# Patient Record
Sex: FEMALE
[De-identification: names, ages, dates, MRNs, and addresses within clinical notes are randomized per-mention and may not be internally consistent; named-entity substitution may affect disease eponyms.]

---

## 2017-01-24 ENCOUNTER — CHARTING TRANS (OUTPATIENT)
Dept: OTHER | Age: 42
End: 2017-01-24

## 2017-07-18 ENCOUNTER — APPOINTMENT (OUTPATIENT)
Dept: GENERAL RADIOLOGY | Age: 42
End: 2017-07-18
Attending: PHYSICIAN ASSISTANT
Payer: COMMERCIAL

## 2017-07-18 ENCOUNTER — HOSPITAL ENCOUNTER (OUTPATIENT)
Age: 42
Discharge: HOME | End: 2017-07-18
Payer: COMMERCIAL

## 2017-07-18 VITALS
DIASTOLIC BLOOD PRESSURE: 87 MMHG | SYSTOLIC BLOOD PRESSURE: 119 MMHG | HEART RATE: 82 BPM | BODY MASS INDEX: 27.49 KG/M2 | HEIGHT: 65 IN | WEIGHT: 165 LBS | OXYGEN SATURATION: 99 % | TEMPERATURE: 98 F | RESPIRATION RATE: 18 BRPM

## 2017-07-18 DIAGNOSIS — S90.31XA CONTUSION OF RIGHT HEEL, INITIAL ENCOUNTER: Primary | ICD-10-CM

## 2017-07-18 PROCEDURE — 99213 OFFICE O/P EST LOW 20 MIN: CPT

## 2017-07-18 PROCEDURE — 99203 OFFICE O/P NEW LOW 30 MIN: CPT

## 2017-07-18 PROCEDURE — 73650 X-RAY EXAM OF HEEL: CPT | Performed by: PHYSICIAN ASSISTANT

## 2017-07-19 NOTE — ED INITIAL ASSESSMENT (HPI)
Patient presents to [facility]. Care with cc of right foot/heel injurywhen she came off a slide into the water/pool floor. Now painful to walk and flex toes -pain radiates to up to ankle.

## 2017-07-19 NOTE — ED PROVIDER NOTES
Patient Seen in: [facility]    History   Patient presents with:  Lower Extremity Injury (musculoskeletal)    Stated Complaint: FOOT INJURY     HPI    Patient is a pleasant 30-year-old female that arrives for evaluation of a righ to palpation of the plantar aspect of the right heel. No obvious deformity. No significant ecchymosis. No pain to palpation along the metatarsals. No Achilles insertion tenderness.   Negative Gloria sign  Back: Full range of motion  Skin: No sign of t

## 2017-11-16 ENCOUNTER — OFFICE VISIT (OUTPATIENT)
Dept: FAMILY MEDICINE CLINIC | Facility: CLINIC | Age: 42
End: 2017-11-16

## 2017-11-16 VITALS
HEIGHT: 65 IN | TEMPERATURE: 98 F | DIASTOLIC BLOOD PRESSURE: 84 MMHG | HEART RATE: 94 BPM | RESPIRATION RATE: 20 BRPM | BODY MASS INDEX: 28.82 KG/M2 | OXYGEN SATURATION: 99 % | WEIGHT: 173 LBS | SYSTOLIC BLOOD PRESSURE: 122 MMHG

## 2017-11-16 DIAGNOSIS — L73.9 FOLLICULITIS: ICD-10-CM

## 2017-11-16 DIAGNOSIS — B00.89 HERPETIC DERMATITIS: Primary | ICD-10-CM

## 2017-11-16 PROCEDURE — 99203 OFFICE O/P NEW LOW 30 MIN: CPT | Performed by: PHYSICIAN ASSISTANT

## 2017-11-16 RX ORDER — ACYCLOVIR 50 MG/G
1 CREAM TOPICAL
Qty: 5 G | Refills: 0 | Status: SHIPPED | OUTPATIENT
Start: 2017-11-16 | End: 2018-01-26 | Stop reason: ALTCHOICE

## 2017-11-16 RX ORDER — MUPIROCIN CALCIUM 20 MG/G
1 CREAM TOPICAL DAILY
Qty: 30 G | Refills: 0 | Status: SHIPPED | OUTPATIENT
Start: 2017-11-16 | End: 2017-11-30

## 2017-11-16 NOTE — PROGRESS NOTES
HPI:    Patient ID: Mickey Anderson is a 38year old female. She is a new patient to the practice. HPI  Pt presents to clinic with painful bumps on the bottom of her right foot for the past 2 weeks.  Started on the outside of her right foot and now has mo normal. She has no wheezes. Lymphadenopathy:     She has no cervical adenopathy. Skin:   Right groin region with erythematous papules, some with scabbing. Right ball of foot with cluster of vesicles, with central darkening. Mild tenderness.  No active d

## 2018-01-14 ENCOUNTER — OFFICE VISIT (OUTPATIENT)
Dept: FAMILY MEDICINE CLINIC | Facility: CLINIC | Age: 43
End: 2018-01-14

## 2018-01-14 VITALS
DIASTOLIC BLOOD PRESSURE: 70 MMHG | TEMPERATURE: 98 F | HEART RATE: 89 BPM | BODY MASS INDEX: 28.82 KG/M2 | OXYGEN SATURATION: 98 % | WEIGHT: 173 LBS | SYSTOLIC BLOOD PRESSURE: 106 MMHG | RESPIRATION RATE: 18 BRPM | HEIGHT: 65 IN

## 2018-01-14 DIAGNOSIS — J06.9 UPPER RESPIRATORY TRACT INFECTION, UNSPECIFIED TYPE: Primary | ICD-10-CM

## 2018-01-14 PROCEDURE — 99213 OFFICE O/P EST LOW 20 MIN: CPT | Performed by: NURSE PRACTITIONER

## 2018-01-14 NOTE — PROGRESS NOTES
CHIEF COMPLAINT:   Patient presents with:  Cough/URI: x 6 days      HPI:   Dejuan Pedersen is a 38year old female who presents for upper respiratory symptoms for  6 days.  Patient reports sore throat only at the beginning of sx's, congestion, dry cough, ear NOSE: Nostrils patent, clear nasal discharge, nasal mucosa red and inflammed   THROAT: Oral mucosa pink, moist. Posterior pharynx is not erythematous. no exudates. Tonsils 2/4.     NECK: Supple, non-tender  LUNGS: clear to auscultation bilaterally, no wheez Almost all colds involve a stuffy nose. Other common symptoms include:  · Runny nose  · Sneezing  · Sore throat  · Headache  · Cough  How is a cold treated? Colds usually last 5 to 10 days. Treatment focuses on relieving symptoms.  Treatments may include: Colds usually go away by themselves. But it’s not unusual to get another type of infection while you have a cold.  These can include:  · Sinus infection  · Lung infection, such as bronchitis or pneumonia  · Ear infection  If you have asthma or chronic bronc

## 2018-01-26 ENCOUNTER — OFFICE VISIT (OUTPATIENT)
Dept: FAMILY MEDICINE CLINIC | Facility: CLINIC | Age: 43
End: 2018-01-26

## 2018-01-26 VITALS
HEIGHT: 65 IN | SYSTOLIC BLOOD PRESSURE: 110 MMHG | HEART RATE: 110 BPM | WEIGHT: 174 LBS | BODY MASS INDEX: 28.99 KG/M2 | RESPIRATION RATE: 18 BRPM | DIASTOLIC BLOOD PRESSURE: 72 MMHG | TEMPERATURE: 99 F | OXYGEN SATURATION: 98 %

## 2018-01-26 DIAGNOSIS — J01.00 ACUTE NON-RECURRENT MAXILLARY SINUSITIS: Primary | ICD-10-CM

## 2018-01-26 PROCEDURE — 99213 OFFICE O/P EST LOW 20 MIN: CPT | Performed by: FAMILY MEDICINE

## 2018-01-26 RX ORDER — AMOXICILLIN AND CLAVULANATE POTASSIUM 875; 125 MG/1; MG/1
1 TABLET, FILM COATED ORAL 2 TIMES DAILY
Qty: 28 TABLET | Refills: 0 | Status: SHIPPED | OUTPATIENT
Start: 2018-01-26 | End: 2018-02-09

## 2018-01-26 NOTE — PATIENT INSTRUCTIONS
States she got up from kneeling position yesterday and felt a pop in in her. C/o pain to left 4th toe. Extra Tylenol 2 ours ago with some relief.has taken too much.   Take a probiotic while on the antibiotic and for 7 days after you are done.     flonase twice a day    Hold sudafed    No Mucinex

## 2018-11-05 VITALS
DIASTOLIC BLOOD PRESSURE: 60 MMHG | HEIGHT: 65 IN | BODY MASS INDEX: 26.66 KG/M2 | TEMPERATURE: 99 F | RESPIRATION RATE: 18 BRPM | WEIGHT: 159.99 LBS | OXYGEN SATURATION: 98 % | SYSTOLIC BLOOD PRESSURE: 102 MMHG | HEART RATE: 85 BPM

## 2019-01-08 PROBLEM — Z98.891 HISTORY OF C-SECTION: Status: ACTIVE | Noted: 2019-01-08

## 2019-01-08 PROBLEM — Z98.890 H/O KNEE SURGERY: Status: ACTIVE | Noted: 2019-01-08

## 2019-01-08 PROBLEM — Z98.890 H/O RIGHT KNEE SURGERY: Status: ACTIVE | Noted: 2019-01-08

## 2019-01-08 PROBLEM — Z98.890 H/O LEFT KNEE SURGERY: Status: ACTIVE | Noted: 2019-01-08

## 2019-01-08 PROBLEM — Z98.890 HISTORY OF D&C: Status: ACTIVE | Noted: 2019-01-08

## 2019-01-08 PROBLEM — Z97.5 IUD (INTRAUTERINE DEVICE) IN PLACE: Status: ACTIVE | Noted: 2019-01-08

## 2019-01-08 PROCEDURE — 88175 CYTOPATH C/V AUTO FLUID REDO: CPT | Performed by: NURSE PRACTITIONER

## 2019-01-08 PROCEDURE — 87624 HPV HI-RISK TYP POOLED RSLT: CPT | Performed by: NURSE PRACTITIONER

## 2019-03-04 ENCOUNTER — HOSPITAL ENCOUNTER (OUTPATIENT)
Dept: MAMMOGRAPHY | Age: 44
Discharge: HOME | End: 2019-03-04
Attending: NURSE PRACTITIONER
Payer: COMMERCIAL

## 2019-03-04 DIAGNOSIS — Z12.39 SCREENING FOR MALIGNANT NEOPLASM OF BREAST: ICD-10-CM

## 2019-03-04 PROCEDURE — 77067 SCR MAMMO BI INCL CAD: CPT | Performed by: NURSE PRACTITIONER

## 2019-03-04 PROCEDURE — 77063 BREAST TOMOSYNTHESIS BI: CPT | Performed by: NURSE PRACTITIONER

## 2020-01-03 ENCOUNTER — OFFICE VISIT (OUTPATIENT)
Dept: FAMILY MEDICINE CLINIC | Facility: CLINIC | Age: 45
End: 2020-01-03
Payer: COMMERCIAL

## 2020-01-03 VITALS
DIASTOLIC BLOOD PRESSURE: 66 MMHG | WEIGHT: 174.81 LBS | TEMPERATURE: 99 F | HEIGHT: 65 IN | SYSTOLIC BLOOD PRESSURE: 102 MMHG | HEART RATE: 93 BPM | OXYGEN SATURATION: 99 % | BODY MASS INDEX: 29.12 KG/M2 | RESPIRATION RATE: 20 BRPM

## 2020-01-03 DIAGNOSIS — J22 LOWER RESPIRATORY INFECTION (E.G., BRONCHITIS, PNEUMONIA, PNEUMONITIS, PULMONITIS): Primary | ICD-10-CM

## 2020-01-03 DIAGNOSIS — J98.01 BRONCHOSPASM: ICD-10-CM

## 2020-01-03 DIAGNOSIS — R05.9 COUGH: ICD-10-CM

## 2020-01-03 PROCEDURE — 99213 OFFICE O/P EST LOW 20 MIN: CPT | Performed by: NURSE PRACTITIONER

## 2020-01-03 RX ORDER — BENZONATATE 200 MG/1
200 CAPSULE ORAL 3 TIMES DAILY PRN
Qty: 20 CAPSULE | Refills: 0 | Status: SHIPPED | OUTPATIENT
Start: 2020-01-03 | End: 2020-01-10

## 2020-01-03 RX ORDER — PREDNISONE 20 MG/1
20 TABLET ORAL 2 TIMES DAILY
Qty: 10 TABLET | Refills: 0 | Status: SHIPPED | OUTPATIENT
Start: 2020-01-03 | End: 2020-01-08

## 2020-01-03 RX ORDER — AZITHROMYCIN 250 MG/1
TABLET, FILM COATED ORAL
Qty: 6 TABLET | Refills: 0 | Status: SHIPPED | OUTPATIENT
Start: 2020-01-03 | End: 2020-01-03

## 2020-01-03 RX ORDER — PREDNISONE 20 MG/1
20 TABLET ORAL 2 TIMES DAILY
Qty: 10 TABLET | Refills: 0 | Status: SHIPPED | OUTPATIENT
Start: 2020-01-03 | End: 2020-01-03

## 2020-01-03 RX ORDER — AZITHROMYCIN 250 MG/1
TABLET, FILM COATED ORAL
Qty: 6 TABLET | Refills: 0 | Status: SHIPPED | OUTPATIENT
Start: 2020-01-03

## 2020-01-03 RX ORDER — BENZONATATE 200 MG/1
200 CAPSULE ORAL 3 TIMES DAILY PRN
Qty: 20 CAPSULE | Refills: 0 | Status: SHIPPED | OUTPATIENT
Start: 2020-01-03 | End: 2020-01-03

## 2020-01-03 NOTE — PATIENT INSTRUCTIONS
Bronchospasm (Adult)    Bronchospasm occurs when the airways (bronchial tubes) go into spasm and contract. This makes it hard to breathe and causes wheezing (a high-pitched whistling sound). Bronchospasm can also cause frequent coughing without wheezing. If you are age 72 or older, have a chronic lung disease or condition that affects your immune system, or you smoke, ask your healthcare provider about getting a pneumococcal vaccine, as well as a yearly flu shot (influenza vaccine).   When to seek medical a · You may use over-the-counter medicines to control fever or pain, unless another medicine was prescribed.  If you have chronic liver or kidney disease or have ever had a stomach ulcer or gastrointestinal bleeding, talk with your healthcare provider before · Trouble breathing, wheezing, or pain with breathing  Date Last Reviewed: 6/1/2018  © 6392-3795 [employer]. [address] [facility] [place] [address] [place] [address]. All rights reserved.  This information is not intended as a substitute for professional me

## 2020-01-03 NOTE — PROGRESS NOTES
CHIEF COMPLAINT:   Patient presents with:  Cough: m1butds, tried, sore throat x1wk    mucinex  Delsym  dayquil  Deep cough, hurts    HPI:   Daniela Espana is a 45year old female who presents for cough for  2  months.   Cough started gradually and is describ HENT: Atraumatic, normocephalic. TM's clear bilaterally. Nostrils patent, nasal mucosa pink and non-inflamed. no erythema of the throat. NECK: supple, non-tender. LUNGS: Normal respiratory rate. Normal effort. Dry cough. no wheezing.  No rales, crackl - benzonatate 200 MG Oral Cap; Take 1 capsule (200 mg total) by mouth 3 (three) times daily as needed for cough. Dispense: 20 capsule; Refill: 0    Treat for lower respiratory in lieu of having a negative chest xray.    Diminished right mid base  PLAN:   I · Drink lots of water or other fluids (at least 10 glasses a day) during an attack. This will loosen lung secretions and make it easier to breathe. If you have heart or kidney disease, check with your doctor before you drink extra fluids.   · Take prescribe Bronchitis is an infection of the air passages (bronchial tubes) in your lungs. It often occurs when you have a cold.  This illness is contagious during the first few days and is spread through the air by coughing and sneezing, or by direct contact (touchin Follow up with your healthcare provider, or as advised. If you had an X-ray or ECG (electrocardiogram), a specialist will review it. You will be told of any new test results that may affect your care.   If you are age 72 or older, if you smoke, or if you ha

## 2020-01-24 ENCOUNTER — APPOINTMENT (OUTPATIENT)
Dept: GENERAL RADIOLOGY | Age: 45
End: 2020-01-24
Attending: FAMILY MEDICINE
Payer: COMMERCIAL

## 2020-01-24 ENCOUNTER — HOSPITAL ENCOUNTER (OUTPATIENT)
Age: 45
Discharge: HOME | End: 2020-01-24
Attending: FAMILY MEDICINE
Payer: COMMERCIAL

## 2020-01-24 VITALS
TEMPERATURE: 98 F | HEART RATE: 85 BPM | SYSTOLIC BLOOD PRESSURE: 118 MMHG | RESPIRATION RATE: 18 BRPM | DIASTOLIC BLOOD PRESSURE: 77 MMHG | OXYGEN SATURATION: 97 %

## 2020-01-24 DIAGNOSIS — J98.01 ACUTE BRONCHOSPASM: Primary | ICD-10-CM

## 2020-01-24 DIAGNOSIS — R05.3 PERSISTENT COUGH FOR 3 WEEKS OR LONGER: ICD-10-CM

## 2020-01-24 PROCEDURE — 99214 OFFICE O/P EST MOD 30 MIN: CPT

## 2020-01-24 PROCEDURE — 71046 X-RAY EXAM CHEST 2 VIEWS: CPT | Performed by: FAMILY MEDICINE

## 2020-01-24 PROCEDURE — 94640 AIRWAY INHALATION TREATMENT: CPT

## 2020-01-24 RX ORDER — ALBUTEROL SULFATE 90 UG/1
2 AEROSOL, METERED RESPIRATORY (INHALATION) EVERY 4 HOURS PRN
Qty: 1 INHALER | Refills: 0 | Status: SHIPPED | OUTPATIENT
Start: 2020-01-24

## 2020-01-24 RX ORDER — DEXAMETHASONE 4 MG/1
12 TABLET ORAL ONCE
Status: COMPLETED | OUTPATIENT
Start: 2020-01-24 | End: 2020-01-24

## 2020-01-24 RX ORDER — IPRATROPIUM BROMIDE AND ALBUTEROL SULFATE 2.5; .5 MG/3ML; MG/3ML
3 SOLUTION RESPIRATORY (INHALATION) ONCE
Status: COMPLETED | OUTPATIENT
Start: 2020-01-24 | End: 2020-01-24

## 2020-01-24 RX ORDER — PREDNISONE 20 MG/1
TABLET ORAL
Qty: 6 TABLET | Refills: 0 | Status: SHIPPED | OUTPATIENT
Start: 2020-01-24

## 2020-01-24 RX ORDER — BUDESONIDE AND FORMOTEROL FUMARATE DIHYDRATE 80; 4.5 UG/1; UG/1
2 AEROSOL RESPIRATORY (INHALATION) 2 TIMES DAILY
Qty: 1 INHALER | Refills: 0 | Status: SHIPPED | OUTPATIENT
Start: 2020-01-24

## 2020-01-24 RX ORDER — IPRATROPIUM BROMIDE 21 UG/1
1 SPRAY, METERED NASAL EVERY 12 HOURS
Qty: 1 BOTTLE | Refills: 0 | Status: SHIPPED | OUTPATIENT
Start: 2020-01-24

## 2020-01-24 NOTE — ED PROVIDER NOTES
Patient Seen in: [facility]      History   Patient presents with:  Cough/URI    Stated Complaint: cough, congestion and diffiulty breathing 1 wk    HPI    This 53-year-old female presents to the office with complaint of persiste air)       Current:/77   Pulse 85   Temp 97.7 °F (36.5 °C) (Temporal)   Resp 18   LMP 01/18/2020 (Exact Date)   SpO2 97%         Physical Exam    General: WH/WN/WD, in no respiratory distress, A and O times 3  HEAD: Normocephalic, atraumatic  EYES: S twice daily, albuterol MDI 2 puffs every 4 hours as needed for any chest tightness, shortness of breath or wheezing, prednisone 20 mg tablets 2 tablets once daily with breakfast for 3 days to start on 1/27/2020 are given.   Usage and side effects are review your tongue after use so you do not get thrush which is yeast of the tongue. Carry your albuterol inhaler as this is your rescue medicine. Use your albuterol 2 puffs every 4 hours as needed for any chest tightness, shortness of breath or wheezing.   Start

## 2020-01-24 NOTE — ED INITIAL ASSESSMENT (HPI)
C/o cough on and off since Nov. 2019, with chest congestion and difficulty breathing for a week . Was diagnosed 1/3/2020 lower respiratory infection and completed Zpack. No fever. Taking OTC Dayquil and Delsym with no resolution.

## 2020-07-11 ENCOUNTER — HOSPITAL ENCOUNTER (OUTPATIENT)
Dept: MAMMOGRAPHY | Age: 45
Discharge: HOME | End: 2020-07-11
Attending: OBSTETRICS & GYNECOLOGY
Payer: COMMERCIAL

## 2020-07-11 ENCOUNTER — LAB ENCOUNTER (OUTPATIENT)
Dept: LAB | Age: 45
End: 2020-07-11
Attending: OBSTETRICS & GYNECOLOGY
Payer: COMMERCIAL

## 2020-07-11 DIAGNOSIS — Z13.220 SCREENING FOR HYPERLIPIDEMIA: Primary | ICD-10-CM

## 2020-07-11 DIAGNOSIS — R53.83 FATIGUE, UNSPECIFIED TYPE: ICD-10-CM

## 2020-07-11 DIAGNOSIS — Z12.31 ENCOUNTER FOR SCREENING MAMMOGRAM FOR MALIGNANT NEOPLASM OF BREAST: ICD-10-CM

## 2020-07-11 LAB
BASOPHILS # BLD AUTO: 0.04 X10(3) UL (ref 0–0.2)
BASOPHILS NFR BLD AUTO: 0.5 %
CHOLEST SMN-MCNC: 213 MG/DL (ref ?–200)
DEPRECATED RDW RBC AUTO: 43.1 FL (ref 35.1–46.3)
EOSINOPHIL # BLD AUTO: 0.12 X10(3) UL (ref 0–0.7)
EOSINOPHIL NFR BLD AUTO: 1.6 %
ERYTHROCYTE [DISTWIDTH] IN BLOOD BY AUTOMATED COUNT: 12.7 % (ref 11–15)
GLUCOSE BLD-MCNC: 87 MG/DL (ref 70–99)
HCT VFR BLD AUTO: 44.1 % (ref 35–48)
HDLC SERPL-MCNC: 42 MG/DL (ref 40–59)
HGB BLD-MCNC: 13.3 G/DL (ref 12–16)
IMM GRANULOCYTES # BLD AUTO: 0.01 X10(3) UL (ref 0–1)
IMM GRANULOCYTES NFR BLD: 0.1 %
LDLC SERPL CALC-MCNC: 151 MG/DL (ref ?–100)
LYMPHOCYTES # BLD AUTO: 2.14 X10(3) UL (ref 1–4)
LYMPHOCYTES NFR BLD AUTO: 29 %
MCH RBC QN AUTO: 28.1 PG (ref 26–34)
MCHC RBC AUTO-ENTMCNC: 30.2 G/DL (ref 31–37)
MCV RBC AUTO: 93 FL (ref 80–100)
MONOCYTES # BLD AUTO: 0.45 X10(3) UL (ref 0.1–1)
MONOCYTES NFR BLD AUTO: 6.1 %
NEUTROPHILS # BLD AUTO: 4.62 X10 (3) UL (ref 1.5–7.7)
NEUTROPHILS # BLD AUTO: 4.62 X10(3) UL (ref 1.5–7.7)
NEUTROPHILS NFR BLD AUTO: 62.7 %
NONHDLC SERPL-MCNC: 171 MG/DL (ref ?–130)
PATIENT FASTING Y/N/NP: YES
PATIENT FASTING Y/N/NP: YES
PLATELET # BLD AUTO: 307 10(3)UL (ref 150–450)
RBC # BLD AUTO: 4.74 X10(6)UL (ref 3.8–5.3)
TRIGL SERPL-MCNC: 101 MG/DL (ref 30–149)
TSI SER-ACNC: 2.49 MIU/ML (ref 0.36–3.74)
VLDLC SERPL CALC-MCNC: 20 MG/DL (ref 0–30)
WBC # BLD AUTO: 7.4 X10(3) UL (ref 4–11)

## 2020-07-11 PROCEDURE — 36415 COLL VENOUS BLD VENIPUNCTURE: CPT

## 2020-07-11 PROCEDURE — 77067 SCR MAMMO BI INCL CAD: CPT | Performed by: OBSTETRICS & GYNECOLOGY

## 2020-07-11 PROCEDURE — 85025 COMPLETE CBC W/AUTO DIFF WBC: CPT

## 2020-07-11 PROCEDURE — 80061 LIPID PANEL: CPT

## 2020-07-11 PROCEDURE — 82947 ASSAY GLUCOSE BLOOD QUANT: CPT

## 2020-07-11 PROCEDURE — 77063 BREAST TOMOSYNTHESIS BI: CPT | Performed by: OBSTETRICS & GYNECOLOGY

## 2020-07-11 PROCEDURE — 84443 ASSAY THYROID STIM HORMONE: CPT

## 2020-07-20 ENCOUNTER — HOSPITAL ENCOUNTER (OUTPATIENT)
Dept: MAMMOGRAPHY | Age: 45
Discharge: HOME | End: 2020-07-20
Attending: OBSTETRICS & GYNECOLOGY
Payer: COMMERCIAL

## 2020-07-20 ENCOUNTER — HOSPITAL ENCOUNTER (OUTPATIENT)
Dept: ULTRASOUND IMAGING | Age: 45
Discharge: HOME | End: 2020-07-20
Attending: OBSTETRICS & GYNECOLOGY
Payer: COMMERCIAL

## 2020-07-20 DIAGNOSIS — R92.2 INCONCLUSIVE MAMMOGRAM: ICD-10-CM

## 2020-07-20 PROCEDURE — 76642 ULTRASOUND BREAST LIMITED: CPT | Performed by: OBSTETRICS & GYNECOLOGY

## 2020-07-20 PROCEDURE — 77065 DX MAMMO INCL CAD UNI: CPT | Performed by: OBSTETRICS & GYNECOLOGY

## 2020-07-20 PROCEDURE — 77061 BREAST TOMOSYNTHESIS UNI: CPT | Performed by: OBSTETRICS & GYNECOLOGY

## 2020-07-29 ENCOUNTER — VIRTUAL PHONE E/M (OUTPATIENT)
Dept: FAMILY MEDICINE CLINIC | Facility: CLINIC | Age: 45
End: 2020-07-29
Payer: COMMERCIAL

## 2020-07-29 DIAGNOSIS — E78.00 ELEVATED CHOLESTEROL: Primary | ICD-10-CM

## 2020-07-29 PROCEDURE — 99213 OFFICE O/P EST LOW 20 MIN: CPT | Performed by: FAMILY MEDICINE

## 2020-09-08 ENCOUNTER — VIRTUAL PHONE E/M (OUTPATIENT)
Dept: FAMILY MEDICINE CLINIC | Facility: CLINIC | Age: 45
End: 2020-09-08
Payer: COMMERCIAL

## 2020-09-08 DIAGNOSIS — R05.9 COUGH: Primary | ICD-10-CM

## 2020-09-08 PROCEDURE — 99213 OFFICE O/P EST LOW 20 MIN: CPT | Performed by: FAMILY MEDICINE

## 2020-09-08 NOTE — PROGRESS NOTES
Virtual Telephone Check-In    Nandocecilio Moreno verbally consents to a Virtual/Telephone Check-In visit on 09/08/20. Patient has been referred to the [facility] website at [email or web address] to review the yearly Consent to Treat document.     Patient oDrita Nix recommendations below. 10 Ways to Manage Your Health at Home      1. Stay home from work, school, and away from other public places. If you must go out, avoid using any kind of public transportation, ridesharing, or taxis.    2. Monitor your symptoms ca facemask before you enter the facility. Additional Information      You can also get more information at the following websites:   Centers for Disease Control & Prevention (CDC)  What to do if you are sick with coronavirus disease 2019, https://www. c

## 2020-09-09 ENCOUNTER — APPOINTMENT (OUTPATIENT)
Dept: LAB | Age: 45
End: 2020-09-09
Attending: FAMILY MEDICINE
Payer: COMMERCIAL

## 2020-09-09 DIAGNOSIS — R05.9 COUGH: ICD-10-CM

## 2020-09-11 LAB — SARS-COV-2 RNA RESP QL NAA+PROBE: NOT DETECTED

## 2022-01-17 PROCEDURE — 87624 HPV HI-RISK TYP POOLED RSLT: CPT | Performed by: NURSE PRACTITIONER

## 2022-01-17 PROCEDURE — 87591 N.GONORRHOEAE DNA AMP PROB: CPT | Performed by: NURSE PRACTITIONER

## 2022-01-17 PROCEDURE — 87491 CHLMYD TRACH DNA AMP PROBE: CPT | Performed by: NURSE PRACTITIONER

## 2022-01-17 PROCEDURE — 88175 CYTOPATH C/V AUTO FLUID REDO: CPT | Performed by: NURSE PRACTITIONER

## 2022-03-16 ENCOUNTER — HOSPITAL ENCOUNTER (OUTPATIENT)
Dept: MAMMOGRAPHY | Age: 47
Discharge: HOME | End: 2022-03-16
Attending: NURSE PRACTITIONER
Payer: COMMERCIAL

## 2022-03-16 DIAGNOSIS — Z12.31 ENCOUNTER FOR SCREENING MAMMOGRAM FOR MALIGNANT NEOPLASM OF BREAST: ICD-10-CM

## 2022-03-16 PROCEDURE — 77063 BREAST TOMOSYNTHESIS BI: CPT | Performed by: NURSE PRACTITIONER

## 2022-03-16 PROCEDURE — 77067 SCR MAMMO BI INCL CAD: CPT | Performed by: NURSE PRACTITIONER

## 2022-08-28 ENCOUNTER — HOSPITAL ENCOUNTER (EMERGENCY)
Age: 47
Discharge: HOME | End: 2022-08-28
Payer: COMMERCIAL

## 2022-08-28 ENCOUNTER — APPOINTMENT (OUTPATIENT)
Dept: GENERAL RADIOLOGY | Age: 47
End: 2022-08-28
Payer: COMMERCIAL

## 2022-08-28 VITALS
WEIGHT: 175 LBS | BODY MASS INDEX: 29.16 KG/M2 | RESPIRATION RATE: 16 BRPM | OXYGEN SATURATION: 98 % | DIASTOLIC BLOOD PRESSURE: 93 MMHG | HEART RATE: 88 BPM | SYSTOLIC BLOOD PRESSURE: 131 MMHG | HEIGHT: 65 IN | TEMPERATURE: 98 F

## 2022-08-28 DIAGNOSIS — M75.42 IMPINGEMENT SYNDROME OF LEFT SHOULDER: Primary | ICD-10-CM

## 2022-08-28 PROCEDURE — 99283 EMERGENCY DEPT VISIT LOW MDM: CPT

## 2022-08-28 PROCEDURE — 73030 X-RAY EXAM OF SHOULDER: CPT

## 2022-08-28 PROCEDURE — 99284 EMERGENCY DEPT VISIT MOD MDM: CPT

## 2022-08-28 RX ORDER — PREDNISONE 20 MG/1
40 TABLET ORAL DAILY
Qty: 10 TABLET | Refills: 0 | Status: SHIPPED | OUTPATIENT
Start: 2022-08-28 | End: 2022-09-02

## 2022-08-28 RX ORDER — NAPROXEN 500 MG/1
500 TABLET ORAL 2 TIMES DAILY PRN
Qty: 20 TABLET | Refills: 0 | Status: SHIPPED | OUTPATIENT
Start: 2022-08-28 | End: 2022-09-04

## 2023-06-27 ENCOUNTER — HOSPITAL ENCOUNTER (OUTPATIENT)
Dept: MAMMOGRAPHY | Age: 48
Discharge: HOME | End: 2023-06-27
Payer: COMMERCIAL

## 2023-06-27 DIAGNOSIS — Z12.31 ENCOUNTER FOR SCREENING MAMMOGRAM FOR MALIGNANT NEOPLASM OF BREAST: ICD-10-CM

## 2023-06-27 PROCEDURE — 77063 BREAST TOMOSYNTHESIS BI: CPT

## 2023-06-27 PROCEDURE — 77067 SCR MAMMO BI INCL CAD: CPT

## 2023-07-07 ENCOUNTER — HOSPITAL ENCOUNTER (OUTPATIENT)
Dept: MAMMOGRAPHY | Facility: HOSPITAL | Age: 48
Discharge: HOME | End: 2023-07-07
Payer: COMMERCIAL

## 2023-07-07 DIAGNOSIS — R92.2 INCONCLUSIVE MAMMOGRAM: ICD-10-CM

## 2023-07-07 PROCEDURE — 77066 DX MAMMO INCL CAD BI: CPT

## 2023-07-07 PROCEDURE — 77062 BREAST TOMOSYNTHESIS BI: CPT

## 2023-10-18 ENCOUNTER — OFFICE VISIT (OUTPATIENT)
Dept: FAMILY MEDICINE CLINIC | Facility: CLINIC | Age: 48
End: 2023-10-18
Payer: COMMERCIAL

## 2023-10-18 VITALS
WEIGHT: 175 LBS | TEMPERATURE: 98 F | OXYGEN SATURATION: 96 % | HEART RATE: 83 BPM | SYSTOLIC BLOOD PRESSURE: 129 MMHG | RESPIRATION RATE: 16 BRPM | DIASTOLIC BLOOD PRESSURE: 81 MMHG | HEIGHT: 65 IN | BODY MASS INDEX: 29.16 KG/M2

## 2023-10-18 DIAGNOSIS — R05.8 COUGH PRESENT FOR GREATER THAN 3 WEEKS: Primary | ICD-10-CM

## 2023-10-18 DIAGNOSIS — J01.00 ACUTE MAXILLARY SINUSITIS, RECURRENCE NOT SPECIFIED: ICD-10-CM

## 2023-10-18 PROCEDURE — 3074F SYST BP LT 130 MM HG: CPT | Performed by: NURSE PRACTITIONER

## 2023-10-18 PROCEDURE — 3008F BODY MASS INDEX DOCD: CPT | Performed by: NURSE PRACTITIONER

## 2023-10-18 PROCEDURE — 3079F DIAST BP 80-89 MM HG: CPT | Performed by: NURSE PRACTITIONER

## 2023-10-18 PROCEDURE — 99213 OFFICE O/P EST LOW 20 MIN: CPT | Performed by: NURSE PRACTITIONER

## 2023-10-18 RX ORDER — PREDNISONE 20 MG/1
40 TABLET ORAL DAILY
Qty: 10 TABLET | Refills: 0 | Status: SHIPPED | OUTPATIENT
Start: 2023-10-18 | End: 2023-10-23

## 2023-10-18 RX ORDER — DOXYCYCLINE HYCLATE 100 MG/1
100 CAPSULE ORAL 2 TIMES DAILY
Qty: 14 CAPSULE | Refills: 0 | Status: SHIPPED | OUTPATIENT
Start: 2023-10-18 | End: 2023-10-25

## 2023-10-18 RX ORDER — ALBUTEROL SULFATE 90 UG/1
1-2 AEROSOL, METERED RESPIRATORY (INHALATION) EVERY 6 HOURS PRN
Qty: 1 EACH | Refills: 0 | Status: SHIPPED | OUTPATIENT
Start: 2023-10-18 | End: 2023-10-28

## 2023-10-18 NOTE — PATIENT INSTRUCTIONS
PLAN: Doxycycline, take as directed. Finish all the medication even if you feel better. Start prednisone TOMORROW MORNING daily for 5 days. Take early in day. May use Albuterol inhaler 1-2 puffs every 6 hours as needed for cough. See below for use. Take daily allergy medication until first good frost.  Salt water gargles (1 tsp. Salt in 6 oz lukewarm water), use several times daily to help decrease swelling and pain. Saline nasal spray to nostrils if needed to help remove drainage or congestion in nose. Hydrate! (cold or hot based on comfort). Drink lots of water or other non dehydrating liquids to help with illness. Salty foods and soups can help with throat pain and swelling as well. Hand washing-use hand  or wash hands frequently, cover your cough or sneeze, do not share towels or drinks with others. May stop OTC multi symptom medications. Can use cough drops to moisten mouth if needed. May use Tylenol or Ibuprofen over the counter for pain/comfort if not contraindicated. Follow up in 2 weeks with primary care to discuss prevention, or sooner if worsening symptoms. Seek immediate care if inability to swallow or breathe.

## 2024-07-01 ENCOUNTER — OFFICE VISIT (OUTPATIENT)
Facility: CLINIC | Age: 49
End: 2024-07-01
Payer: COMMERCIAL

## 2024-07-01 VITALS
WEIGHT: 176 LBS | DIASTOLIC BLOOD PRESSURE: 64 MMHG | HEIGHT: 65 IN | BODY MASS INDEX: 29.32 KG/M2 | SYSTOLIC BLOOD PRESSURE: 120 MMHG

## 2024-07-01 DIAGNOSIS — T83.32XA INTRAUTERINE CONTRACEPTIVE DEVICE THREADS LOST, INITIAL ENCOUNTER: ICD-10-CM

## 2024-07-01 DIAGNOSIS — E78.2 MIXED HYPERLIPIDEMIA: ICD-10-CM

## 2024-07-01 DIAGNOSIS — Z00.00 ANNUAL PHYSICAL EXAM: ICD-10-CM

## 2024-07-01 DIAGNOSIS — Z12.31 ENCOUNTER FOR SCREENING MAMMOGRAM FOR BREAST CANCER: Primary | ICD-10-CM

## 2024-07-01 DIAGNOSIS — Z97.5 IUD CONTRACEPTION: ICD-10-CM

## 2024-07-01 NOTE — PROGRESS NOTES
GYN H&P     Genetic questionnaire reviewed with the patient and she will be referred for genetic counseling if the questionnaire had any positive results.    The [facility] intake form was also reviewed regarding contraception, menstrual periods, urinary health, and vaginal / sexual health    2024  3:02 PM    Chief Complaint   Patient presents with    Physical     No concerns.       HPI: Cathy is a 48 year old  Patient's last menstrual period was 2024 (exact date).  (contraception: Mirena IUD) here for her annual gyn exam.     She has no complaints.   Menses are irregular and light. Denies any pelvic or breast complaints.   Satisfied with current contraception.     IUD strings were not visible at her two check  Ultrasound:  Pelvic ultrasound done for lost IUD done on 2023.     Findings: Anteverted anteflexed uterus measuring 8.5 x 6.0 x 4.9 cm with a 4 mm endometrial stripe.  IUD is seen in good position and with both arms open.  Right ovary measures 1.9 x 1.7 x 1.5 cm and is normal.  The left ovary measures 2.7 x 2.8 x 3.0 cm and is also normal there is a 22 mm dominant follicle in the left ovary.  No free fluid in the cul-de-sac.     Impression: Normal pelvic ultrasound with IUD in good position.     Previous encounters and chart reviewed.     OB History    Para Term  AB Living   3 2 2 0 1 2   SAB IAB Ectopic Multiple Live Births   1 0 0 1 2      # Outcome Date GA Lbr Eldon/2nd Weight Sex Type Anes PTL Lv   3 Term 2011 39w0d    Caesarean   HARSHAD   2 Term 2008 41w0d    Caesarean   HARSHAD   1 SAB 2007 11w0d              GYN hx:   Menarche: 16  Period Cycle (Days): 28  Period Duration (Days): 3  Period Flow: light  Use of Birth Control (if yes, specify type): Mirena IUD  Date When Birth Control Last Used: inserted 2023  Pap Date: 22  Pap Result Notes: 22 NEG/NEG, 19 NEG/NEG  Follow Up Recommendation:       Past Medical History:    IUD  (intrauterine device) in place    Placed 10/2014     Past Surgical History:   Procedure Laterality Date          x2    D&c after delivery      miscarriage    Other      bilat knee arthroscopy    Tonsillectomy       Allergies   Allergen Reactions    Other HIVES     Neoprene-     Current Outpatient Medications on File Prior to Visit   Medication Sig Dispense Refill    Multivitamin Chewtab, ADULT, Oral Chew Tab Chew 1 tablet by mouth daily.      levonorgestrel 20 MCG/24HR Intrauterine IUD 20 mcg (1 each total) by Intrauterine route once.      Budesonide-Formoterol Fumarate (SYMBICORT) 80-4.5 MCG/ACT Inhalation Aerosol Inhale 2 puffs into the lungs 2 (two) times daily. (Patient not taking: Reported on 3/9/2020 ) 1 Inhaler 0    Ipratropium Bromide 0.03 % Nasal Solution 1 spray by Nasal route every 12 (twelve) hours. (Patient not taking: Reported on 3/9/2020 ) 1 Bottle 0     No current facility-administered medications on file prior to visit.     Family History   Problem Relation Age of Onset    Cancer Father         skin cancer    Other (Other) Father         prostate cancer     Social History     Socioeconomic History    Marital status:      Spouse name: Not on file    Number of children: Not on file    Years of education: Not on file    Highest education level: Not on file   Occupational History    Not on file   Tobacco Use    Smoking status: Never    Smokeless tobacco: Never   Vaping Use    Vaping status: Never Used   Substance and Sexual Activity    Alcohol use: Yes     Alcohol/week: 1.0 standard drink of alcohol     Types: 1 Glasses of wine per week    Drug use: No    Sexual activity: Yes     Partners: Male     Birth control/protection: Mirena   Other Topics Concern    Caffeine Concern Not Asked    Exercise Not Asked    Seat Belt Not Asked    Special Diet Not Asked    Stress Concern Not Asked    Weight Concern Not Asked   Social History Narrative    Not on file     Social Determinants of Health      Financial Resource Strain: Not on file   Food Insecurity: Not on file   Transportation Needs: Not on file   Physical Activity: Not on file   Stress: Not on file   Social Connections: Not on file   Housing Stability: Not on file       ROS:     Review of Systems:  General: denies fevers, chills, fatigue and malaise.   Eyes: no visual changes, denies headaches  ENT: no complaints, denies earaches, runny nose, epistaxis, throat pain or sore throat  Respiratory: denies SOB, dyspnea, cough or wheezing  Cardiovascular: denies chest pain, palpitations, exercise intolerance   GI: denies abdominal pain, diarrhea, constipation  : no complaints, denies dysuria, increased urinary frequency. Menses regular, no dysmenorrhea, no menorrhagia, no dyspareunia   Hematological/lymphatic: denies history of excessive bleeding or bruising, denies dizziness, lightheadedness.   Breast: denies rashes, skin changes, pain, lumps or discharge   Psychiatric: denies depression, changes in sleep patterns, anxiety  Endocrine: denies hot or cold intolerance, mood changes   Neurological: denies changes in sight, smell, hearing or taste. Denies seizures or tremors  Immunological: denies allergies, denies anaphylaxis, or swollen lymph nodes  Musculoskeletal: denies joint pain, morning stiffness, decreased range of motion         O /64   Ht 65\"   Wt 176 lb (79.8 kg)   LMP 05/30/2024 (Exact Date)   BMI 29.29 kg/m²         Wt Readings from Last 6 Encounters:   07/01/24 176 lb (79.8 kg)   10/18/23 175 lb (79.4 kg)   08/07/23 187 lb (84.8 kg)   06/26/23 182 lb (82.6 kg)   08/28/22 175 lb (79.4 kg)   01/17/22 183 lb (83 kg)     Exam:   GENERAL: well developed, well nourished, in no apparent distress, oriented.  SKIN: no rashes, no suspicious lesions  HEENT: normal  NECK: supple; no thyromegaly, no adenopathy  LUNGS: clear to auscultation  CARDIOVASCULAR: normal S1, S2, RRR  BREASTS: soft, nontender, no palpable masses or nodes, no nipple  discharge, no skin changes, no axillary adenopathy  ABDOMEN: pfannenstiel scar,  soft, non distended; non tender, no masses  PELVIC: External Genitalia: Normal appearing, no lesions.    Vagina: normal pink mucosa, no lesions, normal clear discharge.    Bladder well supported.  No  anterior or posterior hernias    Cervix: nulliparous - string not visible, no lesions , No CMT     Uterus: AVAF, mobile, non tender, normal size    Adnexa: non tender, no masses, normal size    Rectal: deferred  EXTREMITIES:  non tender without edema        A/P: Patient is 48 year old female with no complaints. Here for well woman exam.            Patient counseled on:    Diet/exercise.      Self Breast Exams     Safe sex practices / and living environment     Vaccines:  Annual Flu, Tdap +/- Gardasil not recommended (up to 45 yrs).      Pneumococcal at 65 yrs old, Shingles at 60 yrs old          Pap: neg/neg - Year:  2022  GC/Chlamydia:  na  Mammogram:  negative,  2023  Dexa:  na  Colonoscopy - recommended   Lipid / Cholesterol:    LIPID PANEL W LDL/HDL RATIO [[id number]] (Abnormal)  Resulted: 07/11/20 1833, Result status: Final result  Resulting lab: [facility] ([facility])     Specimen Information    ID Type Source Collected On   [id number] Blood -- 07/11/20 0948     Components    Component Value Reference Range Flag   Cholesterol, Total 213 <200 mg/dL H High    Comment: Desirable  <200 mg/dL  Borderline  200-239 mg/dL  High      >=240 mg/dL     HDL Cholesterol 42 40 - 59 mg/dL --   Comment:      Interpretive Information:  An HDL cholesterol <40 mg/dL is low and constitutes a coronary heart disease risk factor. An HDL cholesterol >60 mg/dL is a negative risk factor for coronary heart disease.     Triglycerides 101 30 - 149 mg/dL --   Comment:      Reference interval for fasting triglycerides  Desirable: <150 mg/dL  Borderline: 150-199 mg/dL  High: 200-499 mg/dL  Very High: >=500 mg/dL       LDL Cholesterol 151 <100  mg/dL H High    Comment: Desirable <100 mg/dL  Borderline 100-129 mg/dL  High     >=130mg/dL     VLDL 20 0 - 30 mg/dL --   Non HDL Chol 171 <130 mg/dL H High    Comment:      Desirable  <130 mg/dL  Borderline  130-159 mg/dL  High        160-189 mg/dL      Very high >=190 mg/dL          Meds This Visit:    Requested Prescriptions      No prescriptions requested or ordered in this encounter       1. Encounter for screening mammogram for breast cancer  - [facility] FR[facility]ESCO 2D+3D SCREENING BILAT (CPT=77067/72814); Future    2. Annual physical exam    3. IUD contraception    4. Mixed hyperlipidemia    5. Intrauterine contraceptive device threads lost, initial encounter      Return in about 2 weeks (around 7/15/2024) for ultrasound, 1 year WWE.    Marshall Carpenter MD   7/1/2024  3:02 PM       This note was created by [employer] voice recognition. Errors in content may be related to improper recognition by the system; efforts to review and correct have been done but errors may still exist. Please contact me with any questions.

## 2024-07-10 ENCOUNTER — HOSPITAL ENCOUNTER (OUTPATIENT)
Dept: MAMMOGRAPHY | Age: 49
Discharge: HOME | End: 2024-07-10
Attending: OBSTETRICS & GYNECOLOGY
Payer: COMMERCIAL

## 2024-07-10 DIAGNOSIS — Z12.31 ENCOUNTER FOR SCREENING MAMMOGRAM FOR BREAST CANCER: ICD-10-CM

## 2024-07-10 PROCEDURE — 77067 SCR MAMMO BI INCL CAD: CPT | Performed by: OBSTETRICS & GYNECOLOGY

## 2024-07-10 PROCEDURE — 77063 BREAST TOMOSYNTHESIS BI: CPT | Performed by: OBSTETRICS & GYNECOLOGY

## 2024-07-17 ENCOUNTER — ULTRASOUND ENCOUNTER (OUTPATIENT)
Facility: CLINIC | Age: 49
End: 2024-07-17
Payer: COMMERCIAL

## 2024-07-17 DIAGNOSIS — Z30.431 IUD CHECK UP: ICD-10-CM

## 2024-07-17 DIAGNOSIS — T83.32XD INTRAUTERINE CONTRACEPTIVE DEVICE THREADS LOST, SUBSEQUENT ENCOUNTER: Primary | ICD-10-CM

## 2025-04-01 ENCOUNTER — OFFICE VISIT (OUTPATIENT)
Dept: FAMILY MEDICINE CLINIC | Facility: CLINIC | Age: 50
End: 2025-04-01
Payer: COMMERCIAL

## 2025-04-01 VITALS
BODY MASS INDEX: 29 KG/M2 | RESPIRATION RATE: 16 BRPM | TEMPERATURE: 99 F | HEART RATE: 82 BPM | SYSTOLIC BLOOD PRESSURE: 124 MMHG | DIASTOLIC BLOOD PRESSURE: 80 MMHG | OXYGEN SATURATION: 96 % | WEIGHT: 171.63 LBS

## 2025-04-01 DIAGNOSIS — R21 LOCALIZED SKIN ERUPTION: ICD-10-CM

## 2025-04-01 DIAGNOSIS — B37.2 CANDIDAL SKIN INFECTION: Primary | ICD-10-CM

## 2025-04-01 PROCEDURE — 3074F SYST BP LT 130 MM HG: CPT | Performed by: NURSE PRACTITIONER

## 2025-04-01 PROCEDURE — 3079F DIAST BP 80-89 MM HG: CPT | Performed by: NURSE PRACTITIONER

## 2025-04-01 PROCEDURE — 99213 OFFICE O/P EST LOW 20 MIN: CPT | Performed by: NURSE PRACTITIONER

## 2025-04-01 RX ORDER — NYSTATIN AND TRIAMCINOLONE ACETONIDE 100000; 1 [USP'U]/G; MG/G
1 OINTMENT TOPICAL 2 TIMES DAILY
Qty: 60 G | Refills: 0 | Status: SHIPPED | OUTPATIENT
Start: 2025-04-01 | End: 2025-04-15

## 2025-04-01 NOTE — PROGRESS NOTES
CC: Rash.         HPI:  This is a rash problem. The current episode started in the past 14 days. The problem has been persisting since onset. The affected locations include right groin area . The rash is characterized by  . Exposure:   .Pertinent negatives include no anorexia, congestion, cough, diarrhea, eye pain, facial edema, fatigue, fever, joint pain, nail changes, rhinorrhea, shortness of breath, sore throat or vomiting. Past treatments include OTC lotions, not helpful.         Current Outpatient Medications   Medication Sig Dispense Refill    nystatin-triamcinolone 100,000-0.1 Units/g-% External Ointment Apply 1 Application topically 2 (two) times daily for 14 days. 60 g 0    Multivitamin Chewtab, ADULT, Oral Chew Tab Chew 1 tablet by mouth daily.      Budesonide-Formoterol Fumarate (SYMBICORT) 80-4.5 MCG/ACT Inhalation Aerosol Inhale 2 puffs into the lungs 2 (two) times daily. (Patient not taking: Reported on 3/9/2020 ) 1 Inhaler 0    Ipratropium Bromide 0.03 % Nasal Solution 1 spray by Nasal route every 12 (twelve) hours. (Patient not taking: Reported on 3/9/2020 ) 1 Bottle 0    levonorgestrel 20 MCG/24HR Intrauterine IUD 20 mcg (1 each total) by Intrauterine route once.        Past Medical History:    IUD (intrauterine device) in place    Placed 10/2014      Past Surgical History:   Procedure Laterality Date          x2    D&c after delivery      miscarriage    Other      bilat knee arthroscopy    Tonsillectomy        Family History   Problem Relation Age of Onset    Cancer Father         skin cancer    Other (Other) Father         prostate cancer      Social History     Socioeconomic History    Marital status:    Tobacco Use    Smoking status: Never    Smokeless tobacco: Never   Vaping Use    Vaping status: Never Used   Substance and Sexual Activity    Alcohol use: Yes     Alcohol/week: 1.0 standard drink of alcohol     Types: 1 Glasses of wine per week    Drug use: No    Sexual  activity: Yes     Partners: Male     Birth control/protection: Mirena         REVIEW OF SYSTEMS:   GENERAL: feels well otherwise, no fever, no chills.  SKIN: as above.No edema. No ulcerations.  EYES:denies blurred vision or double vision  HEENT: no rhinorrhea. No edema of the lips or swelling of throat.  CHEST: no chest pains, no palpitations.  LUNGS: denies shortness of breath with exertion or rest.No wheezing, no cough.  LYMPH: no enlargement of the lymph nodes.  MUSC/SKEL: no joint swelling,no no joint stiffness.  CARDIOVASCULAR: denies chest pain on exertion or rest.  GI: no nausea, no vomiting or abdominal pain  NEURO: no abnormal sensation, no tingling of the skin or numbness.      EXAM:   /80   Pulse 82   Temp 98.9 °F (37.2 °C) (Oral)   Resp 16   Wt 171 lb 9.6 oz (77.8 kg)   LMP 05/30/2024 (Exact Date)   SpO2 96%   BMI 28.56 kg/m²   GENERAL: well developed, well nourished,in no apparent distress  SKIN: right groin with inflamed beefy red plaque on intertriginous skin with satellite papules and pustules .   EYES:PERRLA, EOMI, normal optic disk,conjunctiva are clear  HEENT: head atraumatic, normocephalic,TM wnl griffin,no erythema of the throat.Moist oral and throat mucosa.  NOSE- normal external nose. Mucosa normal.  NECK: supple,no adenopathy  LUNGS: clear to auscultation  CARDIO: RRR without murmur  GI: good BS's,no masses, HSM or tenderness      ASSESSMENT AND PLAN:   Cathy Kumari is a 49 year old female who presents with:    Encounter Diagnoses   Name Primary?    Localized skin eruption     Candidal skin infection Yes       Meds & Refills for this Visit:  Requested Prescriptions     Signed Prescriptions Disp Refills    nystatin-triamcinolone 100,000-0.1 Units/g-% External Ointment 60 g 0     Sig: Apply 1 Application topically 2 (two) times daily for 14 days.       Imaging & Consults:  None    Skin care d/w the pt.   The patient indicates understanding of these issues and agrees to the plan.  The  patient is asked to return in 3 days if sx's persist or worsen.

## 2025-07-21 ENCOUNTER — OFFICE VISIT (OUTPATIENT)
Facility: CLINIC | Age: 50
End: 2025-07-21
Payer: COMMERCIAL

## 2025-07-21 VITALS
HEIGHT: 65 IN | DIASTOLIC BLOOD PRESSURE: 86 MMHG | WEIGHT: 174 LBS | SYSTOLIC BLOOD PRESSURE: 128 MMHG | BODY MASS INDEX: 28.99 KG/M2

## 2025-07-21 DIAGNOSIS — Z12.31 ENCOUNTER FOR SCREENING MAMMOGRAM FOR BREAST CANCER: ICD-10-CM

## 2025-07-21 DIAGNOSIS — E78.2 MIXED HYPERLIPIDEMIA: ICD-10-CM

## 2025-07-21 DIAGNOSIS — Z00.00 ANNUAL PHYSICAL EXAM: ICD-10-CM

## 2025-07-21 DIAGNOSIS — Z12.4 CERVICAL CANCER SCREENING: Primary | ICD-10-CM

## 2025-07-21 DIAGNOSIS — Z97.5 IUD CONTRACEPTION: ICD-10-CM

## 2025-07-21 PROCEDURE — 88175 CYTOPATH C/V AUTO FLUID REDO: CPT | Performed by: OBSTETRICS & GYNECOLOGY

## 2025-07-21 PROCEDURE — 87625 HPV TYPES 16 & 18 ONLY: CPT | Performed by: OBSTETRICS & GYNECOLOGY

## 2025-07-21 PROCEDURE — 87624 HPV HI-RISK TYP POOLED RSLT: CPT | Performed by: OBSTETRICS & GYNECOLOGY

## 2025-07-21 RX ORDER — NICOTINE POLACRILEX 2 MG
GUM BUCCAL
COMMUNITY

## 2025-07-21 NOTE — PROGRESS NOTES
GYN H&P     Genetic questionnaire reviewed with the patient and she will be referred for genetic counseling if the questionnaire had any positive results.    The [facility] intake form was also reviewed regarding contraception, menstrual periods, urinary health, and vaginal / sexual health    The patient was offered a medical chaperone during the visit    2025  1:48 PM    Chief Complaint   Patient presents with    Physical     No concerns.       HPI: Cathy is a 49 year old  No LMP recorded. (Menstrual status: IUD - Intrauterine Device).  (contraception: H - IUD) here for her annual gyn exam.     She has no complaints.   Menses are absent on IUD . Denies any pelvic or breast complaints.  Satisfied with current contraception.     IUD strings not visible on exam the past 2 years. Placement has been confirmed with ultrasound.    Previous encounters and chart reviewed.     OB History    Para Term  AB Living   3 2 2 0 1 2   SAB IAB Ectopic Multiple Live Births   1 0 0 1 2      # Outcome Date GA Lbr Eldon/2nd Weight Sex Type Anes PTL Lv   3 Term 2011 39w0d    Caesarean   HARSHAD   2 Term 2008 41w0d    Caesarean   HARSHAD   1 SAB 2007 11w0d              GYN hx:   Menarche: 16  Period Duration (Days): 1  Period Flow: spotting  Use of Birth Control (if yes, specify type): Mirena IUD  Date When Birth Control Last Used: inserted 2023  Pap Date: 22  Pap Result Notes: 22 NEG/NEG, 19 NEG/NEG  Follow Up Recommendation:       Past Medical History[1]  Past Surgical History[2]  Allergies[3]  Medications Ordered Prior to Encounter[4]  Family History[5]  Social History     Socioeconomic History    Marital status:      Spouse name: Not on file    Number of children: Not on file    Years of education: Not on file    Highest education level: Not on file   Occupational History    Not on file   Tobacco Use    Smoking status: Never    Smokeless tobacco: Never   Vaping Use     Vaping status: Never Used   Substance and Sexual Activity    Alcohol use: Yes     Alcohol/week: 1.0 standard drink of alcohol     Types: 1 Glasses of wine per week    Drug use: No    Sexual activity: Yes     Partners: Male     Birth control/protection: Mirena   Other Topics Concern    Caffeine Concern Not Asked    Exercise Not Asked    Seat Belt Not Asked    Special Diet Not Asked    Stress Concern Not Asked    Weight Concern Not Asked   Social History Narrative    Not on file     Social Drivers of Health     Food Insecurity: Not on file   Transportation Needs: Not on file   Stress: Not on file   Housing Stability: Not on file       ROS:     Review of Systems:  General: denies fevers, chills, fatigue and malaise.   Eyes: no visual changes, denies headaches  ENT: no complaints, denies earaches, runny nose, epistaxis, throat pain or sore throat  Respiratory: denies SOB, dyspnea, cough or wheezing  Cardiovascular: denies chest pain, palpitations, exercise intolerance   GI: denies abdominal pain, diarrhea, constipation  : no complaints, denies dysuria, increased urinary frequency. Menses absent, no dysmenorrhea, no menorrhagia, no dyspareunia   Hematological/lymphatic: denies history of excessive bleeding or bruising, denies dizziness, lightheadedness.   Breast: denies rashes, skin changes, pain, lumps or discharge   Psychiatric: denies depression, changes in sleep patterns, anxiety  Endocrine: denies hot or cold intolerance, mood changes   Neurological: denies changes in sight, smell, hearing or taste. Denies seizures or tremors  Immunological: denies allergies, denies anaphylaxis, or swollen lymph nodes  Musculoskeletal: denies joint pain, morning stiffness, decreased range of motion         O /86   Ht 65\"   Wt 174 lb (78.9 kg)   BMI 28.96 kg/m²         Wt Readings from Last 6 Encounters:   07/21/25 174 lb (78.9 kg)   04/01/25 171 lb 9.6 oz (77.8 kg)   07/01/24 176 lb (79.8 kg)   10/18/23 175 lb (79.4  kg)   08/07/23 187 lb (84.8 kg)   06/26/23 182 lb (82.6 kg)     Exam:   GENERAL: well developed, well nourished, in no apparent distress, oriented.  SKIN: no rashes, no suspicious lesions  HEENT: normal  NECK: supple; no thyromegaly, no adenopathy  LUNGS: clear to auscultation  CARDIOVASCULAR: normal S1, S2, RRR  BREASTS: soft, nontender, no palpable masses or nodes, no nipple discharge, no skin changes, no axillary adenopathy  ABDOMEN: No scars,  soft, non distended; non tender, no masses  PELVIC: External Genitalia: Normal appearing, no lesions.    Vagina: normal pink mucosa, no lesions, normal clear discharge.    Bladder well supported.  No  anterior or posterior hernias    Cervix: multi parous, no lesions , No CMT     Uterus: AVAF, mobile, non tender, normal size    Adnexa: non tender, no masses, normal size    Rectal: deferred  EXTREMITIES:  non tender without edema      Pelvic ultrasound done for lost IUD done on August 16, 2023.     Findings: Anteverted anteflexed uterus measuring 8.5 x 6.0 x 4.9 cm with a 4 mm endometrial stripe.  IUD is seen in good position and with both arms open.  Right ovary measures 1.9 x 1.7 x 1.5 cm and is normal.  The left ovary measures 2.7 x 2.8 x 3.0 cm and is also normal there is a 22 mm dominant follicle in the left ovary.  No free fluid in the cul-de-sac.     Impression: Normal pelvic ultrasound with IUD in good position.       Patient counseled on:    Diet/exercise.      Self Breast Exams     Safe sex practices / and living environment          Pap: neg/neg - Year:  Negative 01/2022, done today  GC/Chlamydia:  n/a  Mammogram:  negative 2024, ordered  Dexa:  n/a  Colonoscopy / Cologuard: Ordered  Lipid / Cholesterol:  , HDL 42  in 2018, says she will speak with PCP     A/P: Patient is 49 year old female with no complaints. Here for well woman exam.     Meds This Visit:    Requested Prescriptions      No prescriptions requested or ordered in this encounter       1.  Cervical cancer screening  - ThinPrep PAP Smear; Future  - Hpv High Risk , Thin Prep Collection; Future  - ThinPrep PAP Smear  - Hpv High Risk , Thin Prep Collection    2. Encounter for screening mammogram for breast cancer  - [facility] FRANCESCO 2D+3D SCREENING BILAT (CPT=77067/25859); Future    3. Annual physical exam    4. IUD contraception    5. Mixed hyperlipidemia    Patient to dicussed [facility] with PCP    Return in about 1 year (around 2026) for Well Woman Exam.    Marshall Carpenter MD   2025  1:48 PM       This note was created by [employer] voice recognition. Errors in content may be related to improper recognition by the system; efforts to review and correct have been done but errors may still exist. Please contact me with any questions.    Note to patient and family   The  [employer] Cures Act makes medical notes available to patients in the interest of transparency.  However, please be advised that this is a medical document.  It is intended as [id number] communication.  It is written in medical language which may contain abbreviations or verbiage that are technical and unfamiliar.  It may appear blunt or direct.  Medical documents are intended to carry relevant information, facts as evident, and the clinical opinion of the practitioner.           [1]   Past Medical History:   IUD (intrauterine device) in place    Placed 10/2014   [2]   Past Surgical History:  Procedure Laterality Date          x2    D&c after delivery      miscarriage    Other      bilat knee arthroscopy    Tonsillectomy     [3]   Allergies  Allergen Reactions    Other HIVES     Neoprene-   [4]   Current Outpatient Medications on File Prior to Visit   Medication Sig Dispense Refill    Biotin 1 MG Oral Cap Take by mouth.      Multivitamin Chewtab, ADULT, Oral Chew Tab Chew 1 tablet by mouth daily.      levonorgestrel 20 MCG/24HR Intrauterine IUD 20 mcg (1 each total) by Intrauterine route once.      Budesonide-Formoterol Fumarate  (SYMBICORT) 80-4.5 MCG/ACT Inhalation Aerosol Inhale 2 puffs into the lungs 2 (two) times daily. (Patient not taking: Reported on 3/9/2020 ) 1 Inhaler 0    Ipratropium Bromide 0.03 % Nasal Solution 1 spray by Nasal route every 12 (twelve) hours. (Patient not taking: Reported on 3/9/2020 ) 1 Bottle 0     No current facility-administered medications on file prior to visit.   [5]   Family History  Problem Relation Age of Onset    Cancer Father         skin cancer    Other (Other) Father         prostate cancer

## 2025-07-23 LAB — HPV E6+E7 MRNA CVX QL NAA+PROBE: POSITIVE

## 2025-07-29 ENCOUNTER — HOSPITAL ENCOUNTER (OUTPATIENT)
Dept: MAMMOGRAPHY | Age: 50
Discharge: HOME | End: 2025-07-29
Attending: OBSTETRICS & GYNECOLOGY

## 2025-07-29 DIAGNOSIS — Z12.31 ENCOUNTER FOR SCREENING MAMMOGRAM FOR BREAST CANCER: ICD-10-CM

## 2025-07-29 LAB
HPV16 DNA CVX QL PROBE+SIG AMP: NEGATIVE
HPV18 DNA CVX QL PROBE+SIG AMP: NEGATIVE

## 2025-07-29 PROCEDURE — 77067 SCR MAMMO BI INCL CAD: CPT | Performed by: OBSTETRICS & GYNECOLOGY

## 2025-07-29 PROCEDURE — 77063 BREAST TOMOSYNTHESIS BI: CPT | Performed by: OBSTETRICS & GYNECOLOGY

## (undated) NOTE — LETTER
Date: 10/18/2023    Patient Name: Madi Foster          To Whom it may concern: This letter has been written at the patient's request. The above patient was seen at the [facility] for treatment of a medical condition. This patient should be excused from attending work until respiratory symptoms are mostly improved per patient report. The patient  is expected to return to work on or around 10/20/23 with no limitations.       Sincerely,        DIXON Hope